# Patient Record
Sex: MALE | Race: OTHER | Employment: FULL TIME | URBAN - METROPOLITAN AREA
[De-identification: names, ages, dates, MRNs, and addresses within clinical notes are randomized per-mention and may not be internally consistent; named-entity substitution may affect disease eponyms.]

---

## 2018-12-18 ENCOUNTER — HOSPITAL ENCOUNTER (EMERGENCY)
Facility: HOSPITAL | Age: 33
Discharge: HOME | End: 2018-12-19
Attending: EMERGENCY MEDICINE
Payer: COMMERCIAL

## 2018-12-18 DIAGNOSIS — R10.9 ABDOMINAL PAIN OF UNKNOWN ETIOLOGY: Primary | ICD-10-CM

## 2018-12-18 PROCEDURE — 80048 BASIC METABOLIC PNL TOTAL CA: CPT | Performed by: EMERGENCY MEDICINE

## 2018-12-18 PROCEDURE — 99283 EMERGENCY DEPT VISIT LOW MDM: CPT

## 2018-12-18 PROCEDURE — 96374 THER/PROPH/DIAG INJ IV PUSH: CPT

## 2018-12-18 PROCEDURE — 81003 URINALYSIS AUTO W/O SCOPE: CPT | Performed by: EMERGENCY MEDICINE

## 2018-12-18 PROCEDURE — 85025 COMPLETE CBC W/AUTO DIFF WBC: CPT | Performed by: EMERGENCY MEDICINE

## 2018-12-18 PROCEDURE — 80076 HEPATIC FUNCTION PANEL: CPT | Performed by: EMERGENCY MEDICINE

## 2018-12-18 PROCEDURE — 83690 ASSAY OF LIPASE: CPT | Performed by: EMERGENCY MEDICINE

## 2018-12-18 RX ORDER — KETOROLAC TROMETHAMINE 30 MG/ML
30 INJECTION, SOLUTION INTRAMUSCULAR; INTRAVENOUS ONCE
Status: COMPLETED | OUTPATIENT
Start: 2018-12-18 | End: 2018-12-18

## 2018-12-18 RX ORDER — IBUPROFEN 600 MG/1
600 TABLET ORAL EVERY 8 HOURS PRN
Qty: 20 TABLET | Refills: 0 | Status: SHIPPED | OUTPATIENT
Start: 2018-12-18 | End: 2018-12-25

## 2018-12-19 VITALS
HEART RATE: 92 BPM | RESPIRATION RATE: 16 BRPM | DIASTOLIC BLOOD PRESSURE: 78 MMHG | OXYGEN SATURATION: 99 % | TEMPERATURE: 98 F | WEIGHT: 195 LBS | SYSTOLIC BLOOD PRESSURE: 128 MMHG

## 2018-12-19 NOTE — ED NOTES
The patient is cleared for discharge per Emergency Department physician. Discharge instructions were reviewed with patient including when and how to follow up with healthcare providers and when to seek emergency care.  Medication use was reviewed and presc

## 2018-12-19 NOTE — ED PROVIDER NOTES
Patient Seen in: [facility] Emergency Department    History   Patient presents with:  Abdomen/Flank Pain (GI/)    Stated Complaint: abdominal pain    HPI    55-year-old male with no significant past medical history here with complaints of abdomi 12/18/18 2256 131/77   Pulse 12/18/18 2255 93   Resp 12/18/18 2310 16   Temp 12/18/18 2255 98 °F (36.7 °C)   Temp src --    SpO2 12/18/18 2256 99 %   O2 Device --        Current:/77   Pulse 93   Temp 98 °F (36.7 °C)   Resp 16   Wt 88.5 kg   SpO2 99% 0. 78 0.50 - 1.50 mg/dL    Calcium, Total 8.7 8.5 - 10.5 mg/dL    BUN/CREA Ratio 21.8 (H) 10.0 - 20.0    Anion Gap 4 0 - 18 mmol/L    Calculated Osmolality 290 275 - 295 mOsm/kg    GFR, Non- >60 >=60    GFR, -American >60 >=60   HEPAT Imaging Results Available and Reviewed by me while in ED:          EMERGENCY DEPARTMENT COURSE AND TREATMENT:  Patient's condition was stable during Emergency Department evaluation.      33yoM with abdominal pain  - I personally reviewed and interpret hours as needed.   Qty: 20 tablet Refills: 0

## 2018-12-19 NOTE — ED NOTES
Devon Fitzpatrick presents with three days of subumbilical pain. NO nausea, no vomiting, no diarrhea. No fevers. Pt does report heavy lifting. No bulges or masses. Pt does report hx of gall bladder surgery \"With Lasers\".  Pt states pain is worse when bearing down, la

## 2025-01-17 ENCOUNTER — APPOINTMENT (OUTPATIENT)
Dept: CT IMAGING | Facility: HOSPITAL | Age: 40
End: 2025-01-17
Attending: EMERGENCY MEDICINE
Payer: COMMERCIAL

## 2025-01-17 ENCOUNTER — HOSPITAL ENCOUNTER (INPATIENT)
Facility: HOSPITAL | Age: 40
LOS: 1 days | Discharge: HOME | End: 2025-01-19
Attending: EMERGENCY MEDICINE | Admitting: EMERGENCY MEDICINE
Payer: COMMERCIAL

## 2025-01-17 ENCOUNTER — APPOINTMENT (OUTPATIENT)
Dept: GENERAL RADIOLOGY | Facility: HOSPITAL | Age: 40
End: 2025-01-17
Attending: EMERGENCY MEDICINE
Payer: COMMERCIAL

## 2025-01-17 DIAGNOSIS — J45.51 SEVERE PERSISTENT ASTHMA WITH EXACERBATION (HCC): ICD-10-CM

## 2025-01-17 DIAGNOSIS — R09.02 HYPOXIC: Primary | ICD-10-CM

## 2025-01-17 DIAGNOSIS — I27.20 PULMONARY HYPERTENSION (HCC): ICD-10-CM

## 2025-01-17 DIAGNOSIS — J18.9 ATYPICAL PNEUMONIA: ICD-10-CM

## 2025-01-17 DIAGNOSIS — R60.0 PERIPHERAL EDEMA: ICD-10-CM

## 2025-01-17 LAB
ALBUMIN SERPL-MCNC: 3.9 G/DL (ref 3.2–4.8)
ALP LIVER SERPL-CCNC: 86 U/L
ALT SERPL-CCNC: 26 U/L
ANION GAP SERPL CALC-SCNC: 4 MMOL/L (ref 0–18)
AST SERPL-CCNC: 30 U/L (ref ?–34)
BASOPHILS # BLD AUTO: 0.02 X10(3) UL (ref 0–0.2)
BASOPHILS NFR BLD AUTO: 0.2 %
BILIRUB DIRECT SERPL-MCNC: 0.2 MG/DL (ref ?–0.3)
BILIRUB SERPL-MCNC: 0.6 MG/DL (ref 0.3–1.2)
BILIRUB UR QL: NEGATIVE
BNP SERPL-MCNC: 96 PG/ML (ref ?–100)
BUN BLD-MCNC: 12 MG/DL (ref 9–23)
BUN/CREAT SERPL: 13.5 (ref 10–20)
CALCIUM BLD-MCNC: 8.8 MG/DL (ref 8.7–10.4)
CHLORIDE SERPL-SCNC: 102 MMOL/L (ref 98–112)
CLARITY UR: CLEAR
CO2 SERPL-SCNC: 33 MMOL/L (ref 21–32)
CREAT BLD-MCNC: 0.89 MG/DL
D DIMER PPP FEU-MCNC: 0.48 UG/ML FEU (ref ?–0.5)
DEPRECATED RDW RBC AUTO: 48.5 FL (ref 35.1–46.3)
EGFRCR SERPLBLD CKD-EPI 2021: 112 ML/MIN/1.73M2 (ref 60–?)
EOSINOPHIL # BLD AUTO: 0.11 X10(3) UL (ref 0–0.7)
EOSINOPHIL NFR BLD AUTO: 1.2 %
ERYTHROCYTE [DISTWIDTH] IN BLOOD BY AUTOMATED COUNT: 13.6 % (ref 11–15)
GLUCOSE BLD-MCNC: 101 MG/DL (ref 70–99)
GLUCOSE UR-MCNC: NORMAL MG/DL
HCT VFR BLD AUTO: 47.1 %
HGB BLD-MCNC: 15 G/DL
HGB UR QL STRIP.AUTO: NEGATIVE
IMM GRANULOCYTES # BLD AUTO: 0.02 X10(3) UL (ref 0–1)
IMM GRANULOCYTES NFR BLD: 0.2 %
KETONES UR-MCNC: NEGATIVE MG/DL
LEUKOCYTE ESTERASE UR QL STRIP.AUTO: NEGATIVE
LYMPHOCYTES # BLD AUTO: 1.99 X10(3) UL (ref 1–4)
LYMPHOCYTES NFR BLD AUTO: 21.3 %
MCH RBC QN AUTO: 30.7 PG (ref 26–34)
MCHC RBC AUTO-ENTMCNC: 31.8 G/DL (ref 31–37)
MCV RBC AUTO: 96.3 FL
MONOCYTES # BLD AUTO: 0.89 X10(3) UL (ref 0.1–1)
MONOCYTES NFR BLD AUTO: 9.5 %
NEUTROPHILS # BLD AUTO: 6.3 X10 (3) UL (ref 1.5–7.7)
NEUTROPHILS # BLD AUTO: 6.3 X10(3) UL (ref 1.5–7.7)
NEUTROPHILS NFR BLD AUTO: 67.6 %
NITRITE UR QL STRIP.AUTO: NEGATIVE
OSMOLALITY SERPL CALC.SUM OF ELEC: 288 MOSM/KG (ref 275–295)
PH UR: 7 [PH] (ref 5–8)
PLATELET # BLD AUTO: 250 10(3)UL (ref 150–450)
POTASSIUM SERPL-SCNC: 4.8 MMOL/L (ref 3.5–5.1)
PROT SERPL-MCNC: 8.1 G/DL (ref 5.7–8.2)
PROT UR-MCNC: 30 MG/DL
RBC # BLD AUTO: 4.89 X10(6)UL
SODIUM SERPL-SCNC: 139 MMOL/L (ref 136–145)
SP GR UR STRIP: 1.02 (ref 1–1.03)
TROPONIN I SERPL HS-MCNC: 5 NG/L
UROBILINOGEN UR STRIP-ACNC: NORMAL
WBC # BLD AUTO: 9.3 X10(3) UL (ref 4–11)

## 2025-01-17 PROCEDURE — 99222 1ST HOSP IP/OBS MODERATE 55: CPT | Performed by: INTERNAL MEDICINE

## 2025-01-17 PROCEDURE — 71045 X-RAY EXAM CHEST 1 VIEW: CPT | Performed by: EMERGENCY MEDICINE

## 2025-01-17 PROCEDURE — 71260 CT THORAX DX C+: CPT | Performed by: EMERGENCY MEDICINE

## 2025-01-17 RX ORDER — AZITHROMYCIN 250 MG/1
500 TABLET, FILM COATED ORAL ONCE
Status: COMPLETED | OUTPATIENT
Start: 2025-01-17 | End: 2025-01-17

## 2025-01-17 RX ORDER — ALBUTEROL SULFATE 5 MG/ML
10 SOLUTION RESPIRATORY (INHALATION) ONCE
Status: COMPLETED | OUTPATIENT
Start: 2025-01-17 | End: 2025-01-17

## 2025-01-17 RX ORDER — DEXAMETHASONE SODIUM PHOSPHATE 4 MG/ML
8 VIAL (ML) INJECTION ONCE
Status: COMPLETED | OUTPATIENT
Start: 2025-01-17 | End: 2025-01-17

## 2025-01-18 ENCOUNTER — APPOINTMENT (OUTPATIENT)
Dept: CV DIAGNOSTICS | Facility: HOSPITAL | Age: 40
End: 2025-01-18
Attending: INTERNAL MEDICINE
Payer: COMMERCIAL

## 2025-01-18 PROBLEM — J18.9 ATYPICAL PNEUMONIA: Status: ACTIVE | Noted: 2025-01-18

## 2025-01-18 PROBLEM — R60.0 PERIPHERAL EDEMA: Status: ACTIVE | Noted: 2025-01-18

## 2025-01-18 PROBLEM — J45.51 SEVERE PERSISTENT ASTHMA WITH EXACERBATION (HCC): Status: ACTIVE | Noted: 2025-01-18

## 2025-01-18 LAB
ADENOVIRUS PCR:: NOT DETECTED
B PARAPERT DNA SPEC QL NAA+PROBE: NOT DETECTED
B PERT DNA SPEC QL NAA+PROBE: NOT DETECTED
C PNEUM DNA SPEC QL NAA+PROBE: NOT DETECTED
CORONAVIRUS 229E PCR:: NOT DETECTED
CORONAVIRUS HKU1 PCR:: NOT DETECTED
CORONAVIRUS NL63 PCR:: NOT DETECTED
CORONAVIRUS OC43 PCR:: NOT DETECTED
FLUAV RNA SPEC QL NAA+PROBE: NOT DETECTED
FLUBV RNA SPEC QL NAA+PROBE: NOT DETECTED
METAPNEUMOVIRUS PCR:: NOT DETECTED
MYCOPLASMA PNEUMONIA PCR:: NOT DETECTED
PARAINFLUENZA 1 PCR:: NOT DETECTED
PARAINFLUENZA 2 PCR:: NOT DETECTED
PARAINFLUENZA 3 PCR:: NOT DETECTED
PARAINFLUENZA 4 PCR:: NOT DETECTED
PROCALCITONIN SERPL-MCNC: <0.04 NG/ML (ref ?–0.05)
RHINOVIRUS/ENTERO PCR:: NOT DETECTED
RSV RNA SPEC QL NAA+PROBE: NOT DETECTED
SARS-COV-2 RNA NPH QL NAA+NON-PROBE: NOT DETECTED

## 2025-01-18 PROCEDURE — 93306 TTE W/DOPPLER COMPLETE: CPT | Performed by: INTERNAL MEDICINE

## 2025-01-18 PROCEDURE — 99223 1ST HOSP IP/OBS HIGH 75: CPT | Performed by: INTERNAL MEDICINE

## 2025-01-18 PROCEDURE — 99233 SBSQ HOSP IP/OBS HIGH 50: CPT | Performed by: INTERNAL MEDICINE

## 2025-01-18 RX ORDER — ACETAMINOPHEN 500 MG
500 TABLET ORAL EVERY 4 HOURS PRN
Status: DISCONTINUED | OUTPATIENT
Start: 2025-01-18 | End: 2025-01-19

## 2025-01-18 RX ORDER — ALBUTEROL SULFATE 0.83 MG/ML
2.5 SOLUTION RESPIRATORY (INHALATION) EVERY 4 HOURS PRN
Status: DISCONTINUED | OUTPATIENT
Start: 2025-01-18 | End: 2025-01-19

## 2025-01-18 RX ORDER — ALBUTEROL SULFATE 1.25 MG/3ML
1 SOLUTION RESPIRATORY (INHALATION) 2 TIMES DAILY
COMMUNITY
Start: 2025-01-13 | End: 2025-02-12

## 2025-01-18 RX ORDER — METHYLPREDNISOLONE SODIUM SUCCINATE 40 MG/ML
40 INJECTION INTRAMUSCULAR; INTRAVENOUS EVERY 12 HOURS
Status: DISCONTINUED | OUTPATIENT
Start: 2025-01-18 | End: 2025-01-19

## 2025-01-18 RX ORDER — PREDNISONE 20 MG/1
40 TABLET ORAL DAILY
Status: DISCONTINUED | OUTPATIENT
Start: 2025-01-18 | End: 2025-01-18

## 2025-01-18 RX ORDER — FLUTICASONE PROPIONATE AND SALMETEROL 500; 50 UG/1; UG/1
1 POWDER RESPIRATORY (INHALATION) 2 TIMES DAILY
COMMUNITY

## 2025-01-18 RX ORDER — FLUTICASONE PROPIONATE AND SALMETEROL 250; 50 UG/1; UG/1
1 POWDER RESPIRATORY (INHALATION) 2 TIMES DAILY
Status: DISCONTINUED | OUTPATIENT
Start: 2025-01-18 | End: 2025-01-19

## 2025-01-18 RX ORDER — FUROSEMIDE 10 MG/ML
20 INJECTION INTRAMUSCULAR; INTRAVENOUS
Status: DISCONTINUED | OUTPATIENT
Start: 2025-01-18 | End: 2025-01-19

## 2025-01-18 RX ORDER — ENOXAPARIN SODIUM 100 MG/ML
40 INJECTION SUBCUTANEOUS DAILY
Status: DISCONTINUED | OUTPATIENT
Start: 2025-01-18 | End: 2025-01-19

## 2025-01-18 NOTE — PLAN OF CARE
Admit from the ED. A&Ox4. 3 L NC. Denies nausea, denies SOB, denies chest pain. Voiding to the restroom. Saline locked. Denies pain. PCR collected. Up by self. Old scabs/scraps noted on maddy lower legs. Bed locked and lowered. Non-skid socks on. Plan for pulmonolgy consult.    Problem: Patient Centered Care  Goal: Patient preferences are identified and integrated in the patient's plan of care  Description: Interventions:  - What would you like us to know as we care for you? \"I live at home with my mother.\"  - Provide timely, complete, and accurate information to patient/family  - Incorporate patient and family knowledge, values, beliefs, and cultural backgrounds into the planning and delivery of care  - Encourage patient/family to participate in care and decision-making at the level they choose  - Honor patient and family perspectives and choices  Outcome: Progressing     Problem: Patient/Family Goals  Goal: Patient/Family Long Term Goal  Description: Patient's Long Term Goal:     Interventions:  -   - See additional Care Plan goals for specific interventions  Outcome: Progressing  Goal: Patient/Family Short Term Goal  Description: Patient's Short Term Goal:     Interventions:   -   - See additional Care Plan goals for specific interventions  Outcome: Progressing

## 2025-01-18 NOTE — PLAN OF CARE
Pt is A&Ox 4, weaned to and tolerating 1L O2 via NC, tolerating regular diet, voiding freely, denies pain, ambulating independent. Cardiac Echo completed today. Lasix started today per MD. Call light within reach, calls appropriately, I-bed awareness/alarm on.       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  - Provide timely, complete, and accurate information to patient/family  - Incorporate patient and family knowledge, values, beliefs, and cultural backgrounds into the planning and delivery of care  - Encourage patient/family to participate in care and decision-making at the level they choose  - Honor patient and family perspectives and choices  Outcome: Progressing     Problem: Patient/Family Goals  Goal: Patient/Family Long Term Goal  Description: Patient's Long Term Goal: return home     Interventions:  - tolerate discomfort, improving breathing, tolerate interventions   - See additional Care Plan goals for specific interventions  Outcome: Progressing  Goal: Patient/Family Short Term Goal  Description: Patient's Short Term Goal: tolerate discomfort     Interventions:   - PRN nebs, PRN robitussin, PRN O2 via NC   - See additional Care Plan goals for specific interventions  Outcome: Progressing

## 2025-01-18 NOTE — ED INITIAL ASSESSMENT (HPI)
BLE swelling for the last 6months. Today he is coming in because he felt like now they are hard, having some tingling to the bottom of his feet, and it hurts when he walks.  States was started on water pills a few months ago but stopped taking them because they were doing nothing. +SOB, Denies fevers.

## 2025-01-18 NOTE — ED PROVIDER NOTES
Patient Seen in: [facility] Emergency Department    History     Chief Complaint   Patient presents with    Swelling Edema     Stated Complaint: feet swelling    HPI    Patient reports he has sig swelling in legs for months.  Had work up with doctor no findings to explain.  complains of shortness of breath that began 3 days ago.  + cough.  no chest pain.   no fever or chills.   no recent long car ride or plane ride.   History of asthma.    Symptoms worse with activity.    Symptoms improved with rest.     Past Medical History:    Asthma (HCC)       History reviewed. No pertinent surgical history.         No family history on file.    Social History     Socioeconomic History    Marital status: Single   Tobacco Use    Smoking status: Never    Smokeless tobacco: Never   Vaping Use    Vaping status: Never Used   Substance and Sexual Activity    Alcohol use: Yes     Comment: soical    Drug use: Never     Social Drivers of Health     Financial Resource Strain: Not on File (3/6/2022)    Received from [facility]    Financial Resource Strain     Financial Resource Strain: 0   Food Insecurity: Not on File (2024)    Received from [facility]    Food Insecurity     Food: 0   Transportation Needs: Not on File (3/6/2022)    Received from [facility]    Transportation Needs     Transportation: 0   Physical Activity: Not on File (3/6/2022)    Received from [facility]    Physical Activity     Physical Activity: 0   Stress: Not on File (3/6/2022)    Received from [facility]    Stress     Stress: 0   Social Connections: Not on File (2024)    Received from [facility]    Social Connections     Connectedness: 0   Housing Stability: Not on File (3/6/2022)    Received from [facility]    Housing Stability     Housin       Review of Systems    Positive for stated complaint: feet swelling  Other systems are as noted in HPI.  Constitutional and vital signs reviewed.      All other systems reviewed and negative except as noted above.    PSFH elements reviewed  from today and agreed except as otherwise stated in HPI.    Physical Exam     ED Triage Vitals [01/17/25 1932]   /84   Pulse 96   Resp 18   Temp 98.9 °F (37.2 °C)   Temp src Temporal   SpO2 (!) 84 %   O2 Device None (Room air)       Current:/78   Pulse 91   Temp 98.9 °F (37.2 °C) (Temporal)   Resp 18   Ht 162.6 cm (5' 4\")   Wt 127 kg   SpO2 96%   BMI 48.06 kg/m²   PULSE OX 68% room air  GENERAL: mod/sig resp distress on room air  HEAD: normocephalic, atraumatic,   EYES: PERRLA, EOMI,  THROAT: mm dry, no lesions  NECK: supple, no meningeal signs  LUNGS:b exp wheezing little air movement  CARDIO: rr  GI: abdomen is soft and non tender, no masses, nl bowel sounds   EXTREMITIES: from, 5/5 strength in all 4 ext,2+ edema ble  NEURO: alert and oiented *3, 2-12 intact, no focal deficit noted  SKIN: good skin turgor, no  rashes  PSYCH: calm, cooperative,    Differential includes: pneumonia vs. CHF vs. bronchitis vs. PE vs. Asthma exacerbation      ED Course     Labs Reviewed   CBC WITH DIFFERENTIAL WITH PLATELET - Abnormal; Notable for the following components:       Result Value    RDW-SD 48.5 (*)     All other components within normal limits   BASIC METABOLIC PANEL (8) - Abnormal; Notable for the following components:    Glucose 101 (*)     CO2 33.0 (*)     All other components within normal limits   URINALYSIS WITH CULTURE REFLEX - Abnormal; Notable for the following components:    Protein Urine 30 (*)     RBC Urine 3-5 (*)     Squamous Epi. Cells Few (*)     All other components within normal limits   HEPATIC FUNCTION PANEL (7) - Normal   BNP (B TYPE NATRIURETIC PEPTIDE) - Normal   D-DIMER - Normal   TROPONIN I HIGH SENSITIVITY - Normal       MDM     Monitor Interpretation:  Nsr 98    Radiology Interpretation:  XR CHEST AP PORTABLE  (CPT=71045)    Result Date: 1/17/2025  CONCLUSION:  Cardiomegaly with indeterminate masslike opacity in the right suprahilar region.    Dictated by (CST): Neal Sinclair,  MD on 1/17/2025 at 8:46 PM     Finalized by (CST): Neal Sinclair MD on 1/17/2025 at 8:47 PM               Medical Decision Making  Problems Addressed:  Atypical pneumonia: acute illness or injury  Hypoxic: acute illness or injury     Details: Cont neb, iv steroids, ddimer + ct neg for PE  Peripheral edema: chronic illness or injury with exacerbation, progression, or side effects of treatment    Amount and/or Complexity of Data Reviewed  Labs: ordered. Decision-making details documented in ED Course.  Radiology: ordered. Decision-making details documented in ED Course.  Discussion of management or test interpretation with external provider(s): PATIENT GIVEN HOUR LONG NEB SOME IMPROVEMENT.  Ct NEG FOR pe     I spent a total of  44 minutes of critical care time in obtaining history, performing a physical exam, bedside monitoring of interventions, collecting and interpreting tests and discussion with consultants but not including time spent performing procedures.    Spoke with Dr. Brennan in ER for admission, consulted pulmonary as well.    Risk  Decision regarding hospitalization.          Disposition and Plan     Clinical Impression:  1. Hypoxic    2. Peripheral edema    3. Atypical pneumonia    4. Severe persistent asthma with exacerbation (HCC)        Disposition:  Admit    Follow-up:  No follow-up provider specified.    Medications Prescribed:  There are no discharge medications for this patient.      Hospital Problems       Present on Admission             ICD-10-CM Noted POA    * (Principal) Hypoxic R09.02 1/17/2025 Unknown

## 2025-01-18 NOTE — CONSULTS
[facility]  part of [facility]    Report of Consultation    Miguel Borja JrTheron Patient Status:  Inpatient    1985 MRN [id number]   Location [facility] 4W/SW/SE Attending Malcolm Viramontes MD   Hosp Day # 0 PCP None Pcp     Date of Admission:  2025    Reason for Consultation:   Dyspnea    History of Present Illness:   Patient is a 39-year-old male with past medical history significant for asthma who presents with chief complaint of dyspnea with some associated lower extremity edema.  States he is compliant with Advair and albuterol MDI and as needed basis.  Denies any cold/URI like presentation.  Hypoxic during hospital course with saturation 84%.  Currently on 2 L.  Denies significant asthma exacerbation over the course last year.    Past Medical History  Past Medical History:    Asthma (HCC)       Past Surgical History  History reviewed. No pertinent surgical history.    Family History  No family history on file.    Social History  Social History     Socioeconomic History    Marital status: Single   Tobacco Use    Smoking status: Never    Smokeless tobacco: Never   Vaping Use    Vaping status: Never Used   Substance and Sexual Activity    Alcohol use: Yes     Comment: soical    Drug use: Never           Current Medications:  Current Facility-Administered Medications   Medication Dose Route Frequency    enoxaparin (Lovenox) 40 MG/0.4ML SUBQ injection 40 mg  40 mg Subcutaneous Daily    acetaminophen (Tylenol Extra Strength) tab 500 mg  500 mg Oral Q4H PRN    albuterol (Ventolin) (2.5 MG/3ML) 0.083% nebulizer solution 2.5 mg  2.5 mg Nebulization Q4H PRN    predniSONE (Deltasone) tab 40 mg  40 mg Oral Daily    guaiFENesin (Robitussin) 100 MG/5 ML oral liquid 200 mg  200 mg Oral Q4H PRN     Medications Prior to Admission   Medication Sig    Albuterol Sulfate 1.25 MG/3ML Inhalation Nebu Soln Take 3 mL (1.25 mg total) by nebulization in the morning and 3 mL (1.25 mg total)  before bedtime.    WIXELA INHUB 500-50 MCG/ACT Inhalation Aerosol Powder, Breath Activated Inhale 1 puff into the lungs 2 (two) times daily.       Allergies  Allergies[1]    Review of Systems:   Constitutional: denies fevers, chills, weakness, fatigue, recent illness  HEENT: denies headache, sore throat, vision loss  Cardio: denies chest pain, chest pressure, palpitations  Respiratory: dyspnea, cough, wheezing, denies hemoptysis   GI: denies nausea, vomiting, abdominal pain  : denies dysuria, hematuria  Musculoskeletal: denies arthralgia, myalgia  Integumentary: denies rash, itching  Neurological: denies syncope, weakness, dizziness,   Psychiatric: denies depression, anxiety  Hematologic: denies bruising        Physical Exam:   Blood pressure 135/85, pulse 102, temperature 97.9 °F (36.6 °C), temperature source Oral, resp. rate 18, height 5' 4\" (1.626 m), weight 278 lb 6.4 oz (126.3 kg), SpO2 93%.    Constitutional: no acute distress  Eyes: PERRL  ENT: nares patent  Neck: neck supple, no JVD  Cardio: RRR, S1 S2  Respiratory: Expiratory Rales  GI: abdomen soft, non tender, active bowel souds, no organomegaly  Extremities: no clubbing, cyanosis, + lower extremity edema  Neurologic: no gross motor deficits  Skin: warm, dry    Results:   Laboratory Data  Lab Results   Component Value Date    WBC 9.3 01/17/2025    HGB 15.0 01/17/2025    HCT 47.1 01/17/2025    .0 01/17/2025    CREATSERUM 0.89 01/17/2025    BUN 12 01/17/2025     01/17/2025    K 4.8 01/17/2025     01/17/2025    CO2 33.0 (H) 01/17/2025     (H) 01/17/2025    CA 8.8 01/17/2025    ALB 3.9 01/17/2025    ALKPHO 86 01/17/2025    TP 8.1 01/17/2025    AST 30 01/17/2025    ALT 26 01/17/2025    LIP 29 12/18/2018    DDIMER 0.48 01/17/2025         Imaging  CT CHEST PE AORTA (IV ONLY) (CPT=71260)    Result Date: 1/18/2025  CONCLUSION:  1. Negative for pulmonary embolism. 2. Diffuse bronchial wall thickening throughout the lungs with associated  air trapping consistent with the patient's history of asthma/bronchitis.  Scattered bandlike opacities throughout both lungs with greatest involvement of the lower lungs could relate to resulting subsegmental atelectasis and/or chronic scarring. 3. Mildly dilated main pulmonary artery, which can be seen with chronic pulmonary hypertension; correlate clinically. 4. Findings that could indicate an underlying seronegative spondyloarthropathy.   A preliminary report was issued by the [facility] teleradiology service. There are no major discrepancies.  [facility]  Dictated by (CST): Castro Taveras MD on 1/18/2025 at 10:47 AM     Finalized by (CST): Castro Taveras MD on 1/18/2025 at 10:56 AM          XR CHEST AP PORTABLE  (CPT=71045)    Result Date: 1/17/2025  CONCLUSION:  Cardiomegaly with indeterminate masslike opacity in the right suprahilar region.    Dictated by (CST): Neal Sinclair MD on 1/17/2025 at 8:46 PM     Finalized by (CST): Neal Sinclair MD on 1/17/2025 at 8:47 PM           Assessment   1.  Acute asthma exacerbation  2.  Lower extremity edema  3.  Acute hypoxemic respiratory failure  4.  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  -CT chest with some bronchial wall thickening seen and scattered opacities present.  -Empiric IV antibiotic therapy at this time  -IV steroids and gradually wean  -ICS/LABA  -Urged avoidance of known triggers  -Concern for underlying GUILLERMINA with snoring and apneic episodes.  Recommend outpatient polysomnography  -Wean oxygen as tolerated  -2D echo pending  -Reviewed vitals, labs and imaging    Marco Antonio Valente DO  Pulmonary Critical Care Medicine  [facility]  1/18/2025  11:48 AM        [1] No Known Allergies

## 2025-01-18 NOTE — PROGRESS NOTES
Progress Note     Miguel Borja Jr. Patient Status:  Inpatient    1985 MRN [id number]   Location [facility] 4W/SW/SE Attending Malcolm Viramontes MD   Hosp Day # 0 PCP None Pcp     Chief Complaint:   Chief Complaint   Patient presents with    Swelling Edema         Subjective:   S: Patient seen and examined, chart reviewed.   Surprised he will stay.  Still on 2 L NC.  Reports doesn't sleep well.  Reports swollen legs and MORALES.       Review of Systems:   10 point ROS completed and was negative, except for pertinent positive and negatives stated in subjective.                Objective:   Vital signs:  Temp:  [97.9 °F (36.6 °C)-98.9 °F (37.2 °C)] 97.9 °F (36.6 °C)  Pulse:  [] 102  Resp:  [18-24] 18  BP: (102-155)/(61-98) 135/85  SpO2:  [84 %-100 %] 93 %    Wt Readings from Last 6 Encounters:   25 278 lb 6.4 oz (126.3 kg)   18 195 lb (88.5 kg)       Physical Exam:    General: No acute distress. On 2 L NC  Respiratory: Clear to auscultation bilaterally. No wheezes. No rhonchi.  Cardiovascular: S1, S2. Regular rate and rhythm. No murmurs, rubs or gallops.   Abdomen: Soft, nontender, protuberant.  Edema of panus and red.  Positive bowel sounds. No rebound or guarding.  Neurologic: No focal neurological deficits.   Musculoskeletal: Moves all extremities.  Extremities: 2+ edema, red, non-tender    Results:   Diagnostic Data:      Labs:    Labs Last 24 Hours:   BMP     CBC    Other     Na 139 Cl 102 BUN 12 Glu 101   Hb 15.0   PTT - Procal <0.04   K 4.8 CO2 33.0 Cr 0.89   WBC 9.3 >< .0  INR - CRP -   Renal Lytes Endo    Hct 47.1   Trop - D dim 0.48   eGFR - Ca 8.8 POC Gluc  -    LFT   pBNP - Lactic -   eGFR AA - PO4 - A1c -   AST 30 APk 86 Prot 8.1  LDL -      Recent Labs   Lab 25  1956   RBC 4.89   HGB 15.0   HCT 47.1   MCV 96.3   MCH 30.7   MCHC 31.8   RDW 13.6   NEPRELIM 6.30   WBC 9.3   .0       No results found for: \"PT\", \"INR\"    Recent Labs   Lab 25    *   BUN 12   CREATSERUM 0.89   CA 8.8   ALB 3.9      K 4.8      CO2 33.0*   ALKPHO 86   AST 30   ALT 26   BILT 0.6   TP 8.1     ECHO PENDING    CT CHEST PE AORTA (IV ONLY) (CPT=71260)    Result Date: 1/18/2025  CONCLUSION:  1. Negative for pulmonary embolism. 2. Diffuse bronchial wall thickening throughout the lungs with associated air trapping consistent with the patient's history of asthma/bronchitis.  Scattered bandlike opacities throughout both lungs with greatest involvement of the lower lungs could relate to resulting subsegmental atelectasis and/or chronic scarring. 3. Mildly dilated main pulmonary artery, which can be seen with chronic pulmonary hypertension; correlate clinically. 4. Findings that could indicate an underlying seronegative spondyloarthropathy.   A preliminary report was issued by the [facility] teleradiology service. There are no major discrepancies.  [facility]  Dictated by (CST): Castro Taveras MD on 1/18/2025 at 10:47 AM     Finalized by (CST): Castro Taveras MD on 1/18/2025 at 10:56 AM          XR CHEST AP PORTABLE  (CPT=71045)    Result Date: 1/17/2025  CONCLUSION:  Cardiomegaly with indeterminate masslike opacity in the right suprahilar region.    Dictated by (CST): Neal Sinclair MD on 1/17/2025 at 8:46 PM     Finalized by (CST): Neal Sinclair MD on 1/17/2025 at 8:47 PM               Pro-Calcitonin  Recent Labs   Lab 01/18/25  0404   PCT <0.04       Cardiac  No results for input(s): \"TROP\", \"PBNP\" in the last 168 hours.    Imaging: Imaging data reviewed in [facility].    CT CHEST PE AORTA (IV ONLY) (CPT=71260)    Result Date: 1/18/2025  CONCLUSION:  1. Negative for pulmonary embolism. 2. Diffuse bronchial wall thickening throughout the lungs with associated air trapping consistent with the patient's history of asthma/bronchitis.  Scattered bandlike opacities throughout both lungs with greatest involvement of the lower lungs could relate to resulting  subsegmental atelectasis and/or chronic scarring. 3. Mildly dilated main pulmonary artery, which can be seen with chronic pulmonary hypertension; correlate clinically. 4. Findings that could indicate an underlying seronegative spondyloarthropathy.   A preliminary report was issued by the [facility] teleradiology service. There are no major discrepancies.  [facility]  Dictated by (CST): Castro Taveras MD on 1/18/2025 at 10:47 AM     Finalized by (CST): Castro Taveras MD on 1/18/2025 at 10:56 AM          XR CHEST AP PORTABLE  (CPT=71045)    Result Date: 1/17/2025  CONCLUSION:  Cardiomegaly with indeterminate masslike opacity in the right suprahilar region.    Dictated by (CST): Neal Sinclair MD on 1/17/2025 at 8:46 PM     Finalized by (CST): Neal Sinclair MD on 1/17/2025 at 8:47 PM              Medications:    enoxaparin  40 mg Subcutaneous Daily    methylPREDNISolone  40 mg Intravenous Q12H    fluticasone-salmeterol  1 puff Inhalation BID       Assessment & Plan:   ASSESSMENT / PLAN:   40 yo presents with LE edema and hypoxia    GUILLERMINA  Needs outpatient PSG  Trial of CPAP  Pulm consulted  Await ECHO, suspect significant PHTN.   Acute hypoxic resp failure  Suspect multifactorial GUILLERMINA and Asthma  On ICS/LABA and alb  On steroids  On o2  Procal neg  Resp viral panel neg    LE edema, Anasarca 2/2 to PHTN  Started on lasix trial  Await ECHO  CPAP trial  Obesity  Dietary consult  Lifestyle changes           Plan of care discussed with patient and family at bedside.  Brother and mother. Acknowledged understanding and agrees to plan. Also discussed with the ED physician.      dvt prophylaxis: sc lovenox  code status: full code  dispo: home upon medical clearance    >55min spent, >50% spent counseling and coordinating care in the form of educating pt/family and d/w consultants and staff. Most of the time spent discussing the above plan.     Estimated date of discharge: tomorrow  Discharge is dependent on: wean  of o2  At this point Mr. Borja is expected to be discharge to: home    Plan of care discussed with patient    Malcolm Viramontes MD, FAAP, FACP  Critical access hospital Hospitalist  I respond to Epic Chat and [facility]

## 2025-01-18 NOTE — H&P
[facility]  part of [facility]                                                                                                          History and Physical     Miguel Borja Jr. Patient Status:  Emergency    1985 MRN [id number]   Location [facility] EMERGENCY DEPARTMENT Attending Melchor Lopez MD   Hosp Day # 0 PCP None Pcp       Chief Complaint: Lower extremity swelling    Subjective:    Miguel Borja Jr. is a 39 year old male with history of asthma, obesity who presented to the ED with worsening lower extremity swelling.  Swelling has been progressively worse now associated with tightness but no pain.  This was previously evaluated about 3 months ago, he was put on Lasix which he has since discontinued as it did not provide any improvement.    He works as a  and is on his feet all day.  He has not noted any change in swelling at beginning of day versus end of day.  He has not tried compression stockings.  In the ED, he also reported shortness of breath associated with his asthma.  He notes this is typical seasonal asthma for him.  He is not any more short of breath than usual.  He just got his meds refilled 2 days ago.  He denies fever or chills.    At presentation to the ED vital stable except for oxygen saturation of 84%.  Labs fairly stable except CO2 33.  BNP 96.  D-dimer 0.48  CXR: Cardiomegaly with indeterminate masslike opacity in the right suprahilar region.  CT chest: No PE.  Scattered bilateral dependent groundglass opacities which may be due to an atypical infectious process or atelectasis.  Mediastinal and cardiac structures are unremarkable.    He has been put on supplemental oxygen.  Started on antibiotics and steroids.  Pulmonology consulted.  He will be admitted for further treatment.    History/Other:      Past Medical History:  Past Medical History:    Asthma (HCC)        Past Surgical History: History reviewed. No pertinent surgical  history.    Social History:  reports that he has never smoked. He has never used smokeless tobacco. He reports current alcohol use. He reports that he does not use drugs.    Family History: No family history on file.    Allergies: Allergies[1]    Medications:  Medications Ordered Prior to Encounter[2]    Review of Systems:   A comprehensive 14 point review of systems was completed.    Pertinent positives and negatives noted in the HPI.    Objective:     /72   Pulse 91   Temp 98.9 °F (37.2 °C) (Temporal)   Resp 21   Ht 5' 4\" (1.626 m)   Wt 280 lb (127 kg)   SpO2 100%   BMI 48.06 kg/m²   General: No acute distress.    HEENT: Normocephalic, atraumatic.  Neck: Supple.  Respiratory: Mild bilateral wheezing, normal effort  Cardiovascular: S1, S2 regular.  Normal rate.   Abdomen: Soft, nontender distended.  Neurologic: Alert, Oriented x 3.  Nonfocal.  Musculoskeletal: No tenderness or deformity.  Extremities: 2+ bilateral lower extremity nonpitting edema   Psychiatric: Appropriate    Results:      Labs:  Labs Last 24 Hours:   BMP     CBC    Other     Na 139 Cl 102 BUN 12 Glu 101   Hb 15.0   PTT - Procal -   K 4.8 CO2 33.0 Cr 0.89   WBC 9.3 >< .0  INR - CRP -   Renal Lytes Endo    Hct 47.1   Trop - D dim 0.48   eGFR - Ca 8.8 POC Gluc  -    LFT   pBNP - Lactic -   eGFR AA - PO4 - A1c -   AST 30 APk 86 Prot 8.1  LDL -     Mg - TSH -   ALT 26 T maddy 0.6 Alb 3.9          COVID-19 Lab Results    COVID-19  No results found for: \"COVID19\"    Pro-Calcitonin  No results for input(s): \"PCT\" in the last 168 hours.    Cardiac  No results for input(s): \"TROP\", \"PBNP\" in the last 168 hours.    Creatinine Kinase  No results for input(s): \"CK\" in the last 168 hours.    Inflammatory Markers  Recent Labs   Lab 01/17/25  2046   DDIMER 0.48       Imaging: Imaging data reviewed in Epic.    Assessment & Plan:    Hypoxia probably due to asthma flare versus infectious etiology versus obesity  Asthma exacerbation  -Chest CT  suggesting atypical infection versus atelectasis  -Patient afebrile without leukocytosis  -Check procalcitonin, respiratory PCR  -Got a dose of antibiotics in the ED, will continue antibiotics if procalcitonin elevated  -Oxygen as tolerated  -Steroids, nebs  -Pulmonology on consult    Bilateral lower extremity swelling, suspect venous insufficiency  -BNP WNL  -Echocardiogram  -Trial of compression stockings    Obesity  -Lifestyle changes      Quality:  DVT Prophylaxis: Lovenox  CODE status: Full code  ALON: TBD      Plan of care discussed with patient and family at bedside.  Brother and mother. Acknowledged understanding and agrees to plan. Also discussed with the ED physician.    56Minutes spent on this admission - examining patient, obtaining history, reviewing previous medical records, going over test results/imaging and discussing plan of care. More than 50% of the time was spent in counseling and/or coordination of care related to hypoxia/asthma exacerbation/edema.   All questions answered.     Cate Brennan MD  1/17/2025                   [1] No Known Allergies  [2]   No current facility-administered medications on file prior to encounter.     No current outpatient medications on file prior to encounter.

## 2025-01-18 NOTE — ED QUICK NOTES
Orders for admission, patient is aware of plan and ready to go upstairs. Any questions, please call ED RN Naheed at extension [phone].     Pt on 3L NC    Patient Covid vaccination status: Unvaccinated     COVID Test Ordered in ED: None    COVID Suspicion at Admission: N/A    Running Infusions:      Mental Status/LOC at time of transport: a&ox4    Other pertinent information:   CIWA score: N/A   NIH score:  N/A

## 2025-01-19 VITALS
SYSTOLIC BLOOD PRESSURE: 131 MMHG | WEIGHT: 278.38 LBS | DIASTOLIC BLOOD PRESSURE: 83 MMHG | BODY MASS INDEX: 47.53 KG/M2 | HEART RATE: 82 BPM | OXYGEN SATURATION: 93 % | HEIGHT: 64 IN | TEMPERATURE: 98 F | RESPIRATION RATE: 18 BRPM

## 2025-01-19 PROBLEM — J18.9 ATYPICAL PNEUMONIA: Status: RESOLVED | Noted: 2025-01-18 | Resolved: 2025-01-19

## 2025-01-19 PROBLEM — R09.02 HYPOXIC: Status: RESOLVED | Noted: 2025-01-17 | Resolved: 2025-01-19

## 2025-01-19 PROBLEM — J45.51 SEVERE PERSISTENT ASTHMA WITH EXACERBATION (HCC): Status: RESOLVED | Noted: 2025-01-18 | Resolved: 2025-01-19

## 2025-01-19 PROCEDURE — 99232 SBSQ HOSP IP/OBS MODERATE 35: CPT | Performed by: INTERNAL MEDICINE

## 2025-01-19 PROCEDURE — 99239 HOSP IP/OBS DSCHRG MGMT >30: CPT | Performed by: INTERNAL MEDICINE

## 2025-01-19 PROCEDURE — 5A09357 ASSISTANCE WITH RESPIRATORY VENTILATION, LESS THAN 24 CONSECUTIVE HOURS, CONTINUOUS POSITIVE AIRWAY PRESSURE: ICD-10-PCS | Performed by: INTERNAL MEDICINE

## 2025-01-19 RX ORDER — FUROSEMIDE 20 MG/1
20 TABLET ORAL 2 TIMES DAILY PRN
Qty: 30 TABLET | Refills: 1 | Status: SHIPPED | OUTPATIENT
Start: 2025-01-19

## 2025-01-19 RX ORDER — PREDNISONE 10 MG/1
20 TABLET ORAL DAILY
Qty: 10 TABLET | Refills: 0 | Status: SHIPPED | OUTPATIENT
Start: 2025-01-19 | End: 2025-01-24

## 2025-01-19 NOTE — PLAN OF CARE
Patient is alert and oriented x4. 3-4L O2 nasal cannula, wore CPAP at night. Tolerating diet. Up independently. Call light within reach. Fall precautions maintained.     Problem: Patient Centered Care  Goal: Patient preferences are identified and integrated in the patient's plan of care  Description: Interventions:  - What would you like us to know as we care for you?   - Provide timely, complete, and accurate information to patient/family  - Incorporate patient and family knowledge, values, beliefs, and cultural backgrounds into the planning and delivery of care  - Encourage patient/family to participate in care and decision-making at the level they choose  - Honor patient and family perspectives and choices  Outcome: Progressing     Problem: Patient/Family Goals  Goal: Patient/Family Long Term Goal  Description: Patient's Long Term Goal:     Interventions:  -   - See additional Care Plan goals for specific interventions  Outcome: Progressing  Goal: Patient/Family Short Term Goal  Description: Patient's Short Term Goal:     Interventions:   -   - See additional Care Plan goals for specific interventions  Outcome: Progressing     Problem: RESPIRATORY - ADULT  Goal: Achieves optimal ventilation and oxygenation  Description: INTERVENTIONS:  - Assess for changes in respiratory status  - Assess for changes in mentation and behavior  - Position to facilitate oxygenation and minimize respiratory effort  - Oxygen supplementation based on oxygen saturation or ABGs  - Provide Smoking Cessation handout, if applicable  - Encourage broncho-pulmonary hygiene including cough, deep breathe, Incentive Spirometry  - Assess the need for suctioning and perform as needed  - Assess and instruct to report SOB or any respiratory difficulty  - Respiratory Therapy support as indicated  - Manage/alleviate anxiety  - Monitor for signs/symptoms of CO2 retention  Outcome: Progressing

## 2025-01-19 NOTE — PLAN OF CARE
Pt is A&Ox 4, room air, tolerating regular diet, voiding freely, denies pain, ambulating independent. Call light within reach, calls appropriately, I-bed awareness/alarm on while admitted. Pt cleared for discharge by MD. Education provided to pt with pt family at bedside - see AVS - pt verbalized understanding, all questions answered to the best of my ability.      Problem: Patient Centered Care  Goal: Patient preferences are identified and integrated in the patient's plan of care  Description: Interventions:  - What would you like us to know as we care for you? Ready to go home   - Provide timely, complete, and accurate information to patient/family  - Incorporate patient and family knowledge, values, beliefs, and cultural backgrounds into the planning and delivery of care  - Encourage patient/family to participate in care and decision-making at the level they choose  - Honor patient and family perspectives and choices  Outcome: Adequate for Discharge     Problem: Patient/Family Goals  Goal: Patient/Family Long Term Goal  Description: Patient's Long Term Goal: return home     Interventions:  - tolerate interventions  - See additional Care Plan goals for specific interventions  Outcome: Adequate for Discharge  Goal: Patient/Family Short Term Goal  Description: Patient's Short Term Goal: improve breathing     Interventions:   - PRN O2, PRN Nebs, Prednisone   - See additional Care Plan goals for specific interventions  Outcome: Adequate for Discharge     Problem: RESPIRATORY - ADULT  Goal: Achieves optimal ventilation and oxygenation  Description: INTERVENTIONS:  - Assess for changes in respiratory status  - Assess for changes in mentation and behavior  - Position to facilitate oxygenation and minimize respiratory effort  - Oxygen supplementation based on oxygen saturation or ABGs  - Provide Smoking Cessation handout, if applicable  - Encourage broncho-pulmonary hygiene including cough, deep breathe, Incentive  Spirometry  - Assess the need for suctioning and perform as needed  - Assess and instruct to report SOB or any respiratory difficulty  - Respiratory Therapy support as indicated  - Manage/alleviate anxiety  - Monitor for signs/symptoms of CO2 retention  Outcome: Adequate for Discharge

## 2025-01-19 NOTE — PROGRESS NOTES
[facility]  part of [facility]     Progress Note    Miguel Borja JrTheron Patient Status:  Inpatient    1985 MRN [id number]   Location [facility] 4W/SW/SE Attending Malcolm Viramontes MD   Hosp Day # 1 PCP None Pcp       Subjective:   Patient seen and examined.  Dyspnea cough improved    Objective:   Blood pressure 131/83, pulse 82, temperature 98.1 °F (36.7 °C), temperature source Oral, resp. rate 18, height 5' 4\" (1.626 m), weight 278 lb 6.4 oz (126.3 kg), SpO2 93%.  Intake/Output:   Last 3 shifts: I/O last 3 completed shifts:  In: 340 [P.O.:240; IV PIGGYBACK:100]  Out: 500 [Urine:500]   This shift: I/O this shift:  In: 200 [P.O.:200]  Out: -      Vent Settings:      Hemodynamic parameters (last 24 hours):      Scheduled Meds:   Current Facility-Administered Medications   Medication Dose Route Frequency    enoxaparin (Lovenox) 40 MG/0.4ML SUBQ injection 40 mg  40 mg Subcutaneous Daily    acetaminophen (Tylenol Extra Strength) tab 500 mg  500 mg Oral Q4H PRN    albuterol (Ventolin) (2.5 MG/3ML) 0.083% nebulizer solution 2.5 mg  2.5 mg Nebulization Q4H PRN    guaiFENesin (Robitussin) 100 MG/5 ML oral liquid 200 mg  200 mg Oral Q4H PRN    methylPREDNISolone sodium succinate (Solu-MEDROL) injection 40 mg  40 mg Intravenous Q12H    fluticasone-salmeterol (Advair Diskus) 250-50 MCG/ACT inhaler 1 puff  1 puff Inhalation BID    furosemide (Lasix) 10 mg/mL injection 20 mg  20 mg Intravenous BID (Diuretic)       Continuous Infusions:     Physical Exam  Constitutional: no acute distress  Eyes: PERRL  ENT: nares pateint  Neck: supple, no JVD  Cardio: RRR, S1 S2  Respiratory: clear to auscultation bilaterally, no wheezing, rales, rhonchi, crackles  GI: abdomen soft, non tender, active bowel sounds, no organomegaly  Extremities: no clubbing, cyanosis, edema  Neurologic: no gross motor deficits  Skin: warm, dry      Results:        CT CHEST PE AORTA (IV ONLY) (CPT=71260)    Result Date:  1/18/2025  CONCLUSION:  1. Negative for pulmonary embolism. 2. Diffuse bronchial wall thickening throughout the lungs with associated air trapping consistent with the patient's history of asthma/bronchitis.  Scattered bandlike opacities throughout both lungs with greatest involvement of the lower lungs could relate to resulting subsegmental atelectasis and/or chronic scarring. 3. Mildly dilated main pulmonary artery, which can be seen with chronic pulmonary hypertension; correlate clinically. 4. Findings that could indicate an underlying seronegative spondyloarthropathy.   A preliminary report was issued by the [facility] teleradiology service. There are no major discrepancies.  [facility]  Dictated by (CST): Castro Taveras MD on 1/18/2025 at 10:47 AM     Finalized by (CST): Castro Taveras MD on 1/18/2025 at 10:56 AM          XR CHEST AP PORTABLE  (CPT=71045)    Result Date: 1/17/2025  CONCLUSION:  Cardiomegaly with indeterminate masslike opacity in the right suprahilar region.    Dictated by (CST): Neal Sinclair MD on 1/17/2025 at 8:46 PM     Finalized by (CST): Neal Sinclair MD on 1/17/2025 at 8:47 PM                 Assessment   1.  Acute asthma exacerbation  2.  Lower extremity edema  3.  Acute hypoxemic respiratory failure  4.  Possible GUILLERMINA     Plan   -Patient presents with evidence of worsening dyspnea with concern for asthma exacerbation also evidence of lower extremity edema.  -CT chest with some bronchial wall thickening seen and scattered opacities present.  -Empiric IV antibiotic therapy at this time  -IV steroids and gradually wean  -ICS/LABA  -Urged avoidance of known triggers  -Concern for underlying GUILLERMINA with snoring and apneic episodes.  Recommend outpatient polysomnography  -Wean oxygen as tolerated  -Okay for discharge from pulmonary perspective with tapering course of prednisone.     Marco Antonio Valente, DO  Pulmonary Critical Care Medicine  [facility]

## 2025-01-19 NOTE — DISCHARGE SUMMARY
[facility]  part of [facility]    Discharge Summary    Miguel Borja Jr. Patient Status:  Inpatient    1985 MRN [id number]   Location [facility] 4W/SW/SE Attending Malcolm Viramontes MD   Hosp Day # 1 PCP None Pcp     Date of Admission: 2025 Disposition: Home or Self Care     Date of Discharge: 2025    Admitting Diagnosis: Peripheral edema [R60.0]  Hypoxic [R09.02]  Atypical pneumonia [J18.9]  Severe persistent asthma with exacerbation (HCC) [J45.51]    Hospital Discharge Diagnoses:   40 yo presents with LE edema and hypoxia     GUILLERMINA  Acute hypoxic resp failur  LE edema, Anasarca 2/2 to PHTN  Obesity          Lace+ Score: 50  59-90 High Risk  29-58 Medium Risk  0-28   Low Risk.    TCM Follow-Up Recommendation:  LACE 29-58: Moderate Risk of readmission after discharge from the hospital.      Problem List:   Patient Active Problem List   Diagnosis    Peripheral edema       Reason for Admission: Leg Swelling    Physical Exam:   General appearance: alert, appears stated age and cooperative  Pulmonary:  clear to auscultation bilaterally  Cardiovascular: S1, S2 normal, no murmur, click, rub or gallop, regular rate and rhythm  Abdominal: soft, non-tender; bowel sounds normal; no masses,  no organomegaly  Extremities: extremities normal, atraumatic, no cyanosis or edema  Psychiatric: calm      History of Present Illness: Miguel Borja Jr. is a 39 year old male with history of asthma, obesity who presented to the ED with worsening lower extremity swelling.  Swelling has been progressively worse now associated with tightness but no pain.  This was previously evaluated about 3 months ago, he was put on Lasix which he has since discontinued as it did not provide any improvement.    He works as a  and is on his feet all day.  He has not noted any change in swelling at beginning of day versus end of day.  He has not tried compression stockings.  In the ED, he also reported  shortness of breath associated with his asthma.  He notes this is typical seasonal asthma for him.  He is not any more short of breath than usual.  He just got his meds refilled 2 days ago.  He denies fever or chills.    Hospital Course: 38 yo presents with LE edema and hypoxia     GUILLERMINA  Needs outpatient PSG  Trial of CPAP  Pulm consulted  Await ECHO, suspect significant PHTN.   Acute hypoxic resp failure  Suspect multifactorial GUILLERMINA and Asthma  On ICS/LABA and alb  On steroids  On o2  Procal neg  Resp viral panel neg     LE edema, Anasarca 2/2 to PHTN  Started on lasix trial  Await ECHO  CPAP trial  Obesity  Dietary consult  Lifestyle changes              Consultations: pulmonary    Procedures: ECHO, showed PHTN    Complications: none    Discharge Condition: Good    Discharge Medications:      Discharge Medications        START taking these medications        Instructions Prescription details   furosemide 20 MG Tabs  Commonly known as: Lasix      Take 1 tablet (20 mg total) by mouth 2 (two) times daily as needed (swollen legs).   Quantity: 30 tablet  Refills: 1     predniSONE 10 MG Tabs  Commonly known as: Deltasone      Take 2 tablets (20 mg total) by mouth daily for 5 days.   Stop taking on: January 24, 2025  Quantity: 10 tablet  Refills: 0            CONTINUE taking these medications        Instructions Prescription details   Albuterol Sulfate 1.25 MG/3ML Nebu  Commonly known as: ACCUNEB      Take 3 mL (1.25 mg total) by nebulization in the morning and 3 mL (1.25 mg total) before bedtime.   Stop taking on: February 12, 2025  Refills: 0     Wixela Inhub 500-50 MCG/ACT Aepb  Generic drug: fluticasone-salmeterol      Inhale 1 puff into the lungs 2 (two) times daily.   Refills: 0               Where to Get Your Medications        These medications were sent to [id number] - [place], [place] - [address], [phone], [phone] [address] [address]       Hours: 24-hours Phone: [phone]   furosemide 20 MG Tabs  predniSONE 10 MG Tabs         Follow up Visits: Follow-up with  in 1 week    Follow up Labs: overnight Polysomnogram     Other Discharge Instructions:     Malcolm Viramontes MD  1/19/2025  12:28 PM    > 35 min

## 2025-01-20 ENCOUNTER — TELEPHONE (OUTPATIENT)
Dept: SLEEP CENTER | Age: 40
End: 2025-01-20

## 2025-01-23 NOTE — PAYOR COMM NOTE
--------------  ADMISSION REVIEW     Payor: ROSANGELA BRUNO POS/MCNP  Subscriber #:  [id number]  Authorization Number: [id number]    Admit date: 1/18/25  Admit time:  2:27 AM       ED Provider Notes        History     HPI  Patient reports he has sig swelling in legs for months.  Had work up with doctor no findings to explain.  complains of shortness of breath that began 3 days ago.  + cough.  no chest pain.   no fever or chills.   no recent long car ride or plane ride.   History of asthma.    Symptoms worse with activity.    Symptoms improved with rest.   ED Triage Vitals [01/17/25 1932]   /84   Pulse 96   Resp 18   Temp 98.9 °F (37.2 °C)   Temp src Temporal   SpO2 (!) 84 %   O2 Device None (Room air)   Current:/78   Pulse 91   Temp 98.9 °F (37.2 °C) (Temporal)   Resp 18   Ht 162.6 cm (5' 4\")   Wt 127 kg   SpO2 96%   BMI 48.06 kg/m²   PULSE OX 68% room air  GENERAL: mod/sig resp distress on room air  HEAD: normocephalic, atraumatic,   EYES: PERRLA, EOMI,  THROAT: mm dry, no lesions  NECK: supple, no meningeal signs  LUNGS:b exp wheezing little air movement  CARDIO: rr  GI: abdomen is soft and non tender, no masses, nl bowel sounds   EXTREMITIES: from, 5/5 strength in all 4 ext,2+ edema ble  NEURO: alert and oiented *3, 2-12 intact, no focal deficit noted  SKIN: good skin turgor, no  rashes  PSYCH: calm, cooperative,    Labs Reviewed   CBC WITH DIFFERENTIAL WITH PLATELET - Abnormal; Notable for the following components:       Result Value    RDW-SD 48.5 (*)     All other components within normal limits   BASIC METABOLIC PANEL (8) - Abnormal; Notable for the following components:    Glucose 101 (*)     CO2 33.0 (*)     All other components within normal limits   URINALYSIS WITH CULTURE REFLEX - Abnormal; Notable for the following components:    Protein Urine 30 (*)     RBC Urine 3-5 (*)     Squamous Epi. Cells Few (*)      XR CHEST AP PORTABLE  (CPT=71045)  Result Date: 1/17/2025  CONCLUSION:  Cardiomegaly  with indeterminate masslike opacity in the right suprahilar region.    Dictated by (CST): Neal Sinclair MD on 1/17/2025 at 8:46 PM     Finalized by (CST): Neal Sinclair MD on 1/17/2025 at 8:47 PM           Disposition and Plan     Clinical Impression:  1. Hypoxic    2. Peripheral edema    3. Atypical pneumonia    4. Severe persistent asthma with exacerbation (HCC)      Disposition:  Admit       History and Physical   Subjective:    Miguel Borja Jr. is a 39 year old male with history of asthma, obesity  Swelling has been progressively worse now associated with tightness but no pain.  This was previously evaluated about 3 months ago, he was put on Lasix which he has since discontinued as it did not provide any improvement.    He works as a  and is on his feet all day.  He has not noted any change in swelling at beginning of day versus end of day.  He has not tried compression stockings.  In the ED, he also reported shortness of breath associated with his asthma.  He notes this is typical seasonal asthma for him.  He is not any more short of breath than usual.  He just got his meds refilled 2 days ago.  He denies fever or chills.     At presentation to the ED vital stable except for oxygen saturation of 84%.  Labs fairly stable except CO2 33.  BNP 96.  D-dimer 0.48  CXR: Cardiomegaly with indeterminate masslike opacity in the right suprahilar region.  CT chest: No PE.  Scattered bilateral dependent groundglass opacities which may be due to an atypical infectious process or atelectasis.  Mediastinal and cardiac structures are unremarkable.     He has been put on supplemental oxygen.  Started on antibiotics and steroids.  Pulmonology consulted.  He will be admitted for further treatment.       /72   Pulse 91   Temp 98.9 °F (37.2 °C) (Temporal)   Resp 21   Ht 5' 4\" (1.626 m)   Wt 280 lb (127 kg)   SpO2 100%   BMI 48.06 kg/m²     HEENT: Normocephalic, atraumatic.  Neck: Supple.  Respiratory: Mild  bilateral wheezing, normal effort  Cardiovascular: S1, S2 regular.  Normal rate.   Abdomen: Soft, nontender distended.  Neurologic: Alert, Oriented x 3.  Nonfocal.  Musculoskeletal: No tenderness or deformity.  Extremities: 2+ bilateral lower extremity nonpitting edema   Psychiatric: Appropriate     Labs Last 24 Hours:                     BMP         CBC       Other      Na 139 Cl 102 BUN 12 Glu 101     Hb 15.0         K 4.8 CO2 33.0 Cr 0.89     WBC 9.3 >< .0        Lytes        Hct 47.1      D dim 0.48    Ca 8.8        LFT                AST 30 APk 86 Prot 8.1                ALT 26 T maddy 0.6 Alb 3.9            Lab 01/17/25 2046   DDIMER 0.48       Assessment & Plan:    Hypoxia probably due to asthma flare versus infectious etiology versus obesity  Asthma exacerbation  -Chest CT suggesting atypical infection versus atelectasis  -Patient afebrile without leukocytosis  -Check procalcitonin, respiratory PCR  -Got a dose of antibiotics in the ED, will continue antibiotics if procalcitonin elevated  -Oxygen as tolerated  -Steroids, nebs  -Pulmonology on consult     Bilateral lower extremity swelling, suspect venous insufficiency  -BNP WNL  -Echocardiogram  -Trial of compression stockings     Obesity  -Lifestyle changes               1/18/25           Still on 2 L NC.  Reports doesn't sleep well.  Reports swollen legs and MORALES.      Temp:  [97.9 °F (36.6 °C)-98.9 °F (37.2 °C)] 97.9 °F (36.6 °C)  Pulse:  [] 102  Resp:  [18-24] 18  BP: (102-155)/(61-98) 135/85  SpO2:  [84 %-100 %] 93 %     Physical Exam:    General: On 2 L NC  Respiratory: Clear to auscultation bilaterally. No wheezes. No rhonchi.  Cardiovascular: S1, S2. Regular rate and rhythm. No murmurs, rubs or gallops.   Abdomen: Soft, nontender, protuberant.  Edema of panus and red.  Positive bowel sounds. No rebound or guarding.  Neurologic: No focal neurological deficits.   Musculoskeletal: Moves all extremities.  Extremities: 2+ edema, red,  non-tender     Labs Last 24 Hours:                      BMP         CBC       Other      Na 139 Cl 102 BUN 12 Glu 101     Hb 15.0      Procal <0.04    K 4.8 CO2 33.0 Cr 0.89     WBC 9.3 >< .0         Lytes        Hct 47.1      D dim 0.48     Ca 8.8                         AST 30 APk 86 Prot 8.1            Lab 01/17/25 1956   RBC 4.89   HGB 15.0   HCT 47.1   MCV 96.3   MCH 30.7   MCHC 31.8   RDW 13.6   NEPRELIM 6.30   WBC 9.3   .0         Lab 01/17/25 1956   *   BUN 12   CREATSERUM 0.89   CA 8.8   ALB 3.9      K 4.8      CO2 33.0*   ALKPHO 86   AST 30   ALT 26   BILT 0.6   TP 8.1      ECHO PENDING     CT CHEST PE AORTA (IV ONLY) (CPT=71260)   Result Date: 1/18/2025  CONCLUSION:         1. Negative for pulmonary embolism. 2. Diffuse bronchial wall thickening throughout the lungs with associated air trapping consistent with the patient's history of asthma/bronchitis.  Scattered bandlike opacities throughout both lungs with greatest involvement of the lower lungs could relate to resulting subsegmental atelectasis and/or chronic scarring. 3. Mildly dilated main pulmonary artery, which can be seen with chronic pulmonary hypertension; correlate clinically. 4. Findings that could indicate an underlying seronegative spondyloarthropathy.   A preliminary report was issued by the [facility] teleradiology service. There are no major discrepancies.  [facility]  Dictated by (CST): Castro Taveras MD on 1/18/2025 at 10:47 AM     Finalized by (CST): Castro Taveras MD on 1/18/2025 at 10:56 AM           XR CHEST AP PORTABLE  (CPT=71045)   Result Date: 1/17/2025  CONCLUSION:         Cardiomegaly with indeterminate masslike opacity in the right suprahilar region.    Dictated by (CST): Neal Sinclair MD on 1/17/2025 at 8:46 PM     Finalized by (CST): Neal Sinclair MD on 1/17/2025 at 8:47 PM            Lab 01/18/25  0404   PCT <0.04       CT CHEST PE AORTA (IV ONLY) (CPT=71260)   Result  Date: 1/18/2025  CONCLUSION:         1. Negative for pulmonary embolism. 2. Diffuse bronchial wall thickening throughout the lungs with associated air trapping consistent with the patient's history of asthma/bronchitis.  Scattered bandlike opacities throughout both lungs with greatest involvement of the lower lungs could relate to resulting subsegmental atelectasis and/or chronic scarring. 3. Mildly dilated main pulmonary artery, which can be seen with chronic pulmonary hypertension; correlate clinically. 4. Findings that could indicate an underlying seronegative spondyloarthropathy.   A preliminary report was issued by the [facility] teleradiology service. There are no major discrepancies.  [facility]  Dictated by (CST): Castro Taveras MD on 1/18/2025 at 10:47 AM     Finalized by (CST): Castro Taveras MD on 1/18/2025 at 10:56 AM           XR CHEST AP PORTABLE  (CPT=71045)   Result Date: 1/17/2025  CONCLUSION:         Cardiomegaly with indeterminate masslike opacity in the right suprahilar region.    Dictated by (CST): Neal Sinclair MD on 1/17/2025 at 8:46 PM     Finalized by (CST): eNal Sinclair MD on 1/17/2025 at 8:47 PM               Medications:    enoxaparin  40 mg Subcutaneous Daily    methylPREDNISolone  40 mg Intravenous Q12H    fluticasone-salmeterol  1 puff Inhalation BID         Assessment & Plan:   38 yo presents with LE edema and hypoxia     GUILLERMINA  Needs outpatient PSG  Trial of CPAP  Pulm consulted  Await ECHO, suspect significant PHTN.   Acute hypoxic resp failure  Suspect multifactorial GUILLERMINA and Asthma  On ICS/LABA and alb  On steroids  On o2  Procal neg  Resp viral panel neg     LE edema, Anasarca 2/2 to PHTN  Started on lasix trial  Await ECHO  CPAP trial  Obesity  Dietary consult  Lifestyle changes          Plan of care discussed with patient and family at bedside.  Brother and mother. Acknowledged understanding and agrees to plan. Also discussed with the ED physician.        dvt  prophylaxis: sc lovenox                   DATE OF DISCHARGE: 1/19/25         Discharge Summary       Date of Admission: 1/17/2025   Disposition: Home or Self Care      Date of Discharge: 1/19/2025     Admitting Diagnosis: Peripheral edema [R60.0]  Hypoxic [R09.02]  Atypical pneumonia [J18.9]  Severe persistent asthma with exacerbation (HCC) [J45.51]     Hospital Discharge Diagnoses:   40 yo presents with LE edema and hypoxia     GUILLERMINA  Acute hypoxic resp failur  LE edema, Anasarca 2/2 to PHTN  Obesity         Lace+ Score: 50  59-90 High Risk  29-58 Medium Risk  0-28   Low Risk.     TCM Follow-Up Recommendation:  LACE 29-58: Moderate Risk of readmission after discharge from the hospital.      Reason for Admission: Leg Swelling     Physical Exam:   General appearance: alert, appears stated age and cooperative  Pulmonary:  clear to auscultation bilaterally  Cardiovascular: S1, S2 normal, no murmur, click, rub or gallop, regular rate and rhythm  Abdominal: soft, non-tender; bowel sounds normal; no masses,  no organomegaly  Extremities: extremities normal, atraumatic, no cyanosis or edema  Psychiatric: calm        History of Present Illness: Miguel Borja Jr. is a 39 year old male with history of asthma, obesity who presented to the ED with worsening lower extremity swelling.  Swelling has been progressively worse now associated with tightness but no pain.  This was previously evaluated about 3 months ago, he was put on Lasix which he has since discontinued as it did not provide any improvement.    He works as a  and is on his feet all day.  He has not noted any change in swelling at beginning of day versus end of day.  He has not tried compression stockings.  In the ED, he also reported shortness of breath associated with his asthma.  He notes this is typical seasonal asthma for him.  He is not any more short of breath than usual.  He just got his meds refilled 2 days ago.  He denies fever or chills.     Hospital  Course: 38 yo presents with LE edema and hypoxia     GUILLERMINA  Needs outpatient PSG  Trial of CPAP  Pulm consulted  Await ECHO, suspect significant PHTN.   Acute hypoxic resp failure  Suspect multifactorial GUILLERMINA and Asthma  On ICS/LABA and alb  On steroids  On o2  Procal neg  Resp viral panel neg     LE edema, Anasarca 2/2 to PHTN  Started on lasix trial  Await ECHO  CPAP trial  Obesity  Dietary consult  Lifestyle changes               Consultations: pulmonary     Procedures: ECHO, showed PHTN     Complications: none     Discharge Condition: Good                         Vitals (last day) before discharge       Date/Time Temp Pulse Resp BP SpO2 Weight O2 Device O2 Flow Rate (L/min) [facility]    01/19/25 1219 -- -- -- -- 93 % -- None (Room air) -- JF    01/19/25 1123 98.1 °F (36.7 °C) -- 18 131/83 93 % -- Nasal cannula 1 L/min     01/19/25 1005 97.8 °F (36.6 °C) 82 18 137/92 95 % -- Nasal cannula 1 L/min     01/19/25 0952 -- -- -- -- -- -- Nasal cannula 1 L/min     01/19/25 0500 97 °F (36.1 °C) 79 20 124/72 97 % -- CPAP 3 L/min     01/19/25 0032 -- -- -- -- 100 % -- CPAP 3 L/min     01/18/25 2221 -- 105 -- -- -- -- -- -- CY    01/18/25 2206 98.8 °F (37.1 °C) 99 20 140/79 98 % -- Nasal cannula 4 L/min     01/18/25 1247 -- -- -- -- 93 % -- Nasal cannula 1 L/min     01/18/25 1246 -- -- -- -- 88 % -- None (Room air) --     01/18/25 1245 -- -- -- -- 96 % -- Nasal cannula 1.5 L/min     01/18/25 1144 97.9 °F (36.6 °C) 102 18 135/85 93 % -- Nasal cannula 1.5 L/min     01/18/25 0856 -- -- -- -- 96 % -- Nasal cannula 1 L/min     01/18/25 0854 -- -- -- -- 97 % -- Nasal cannula 1.5 L/min     01/18/25 0852 98.1 °F (36.7 °C) 86 18 133/80 99 % -- Nasal cannula 3 L/min     01/18/25 0500 -- -- -- 142/86 -- -- -- --     01/18/25 0244 -- -- -- -- -- 278 lb 6.4 oz (126.3 kg) -- --     01/18/25 0228 98 °F (36.7 °C) 96 18 155/98 95 % -- Nasal cannula 3 L/min     01/18/25 0200 -- 91 19 131/84 95 % -- Nasal cannula 3  L/min     01/18/25 0130 -- 92 24 103/66 94 % -- Nasal cannula 3 L/min     01/18/25 0030 -- 97 22 102/61 94 % -- Nasal cannula 3 L/min KR

## (undated) NOTE — LETTER
To Whom It May Concern:    Miguel Borja Jr. is currently under my medical care and may not return to work at this time.  Please excuse Miguel for 4 days. He may return to work on 1/23/2025.    Activity is restricted as follows: none.    If you require additional information please contact our office.    Sincerely,  Malcolm Viramontes MD, FAAP, FACP  [facility] HOSPITALIST  [address] [facility] [address]  [phone]        Document generated by:  Malcolm Viramontes MD

## (undated) NOTE — LETTER
Date & Time: 12/19/2018, 12:10 AM  Patient: Franc Gonzales. Encounter Provider(s): Cristy Espinal MD       To Whom It May Concern:    Wilver Webber was seen and treated in our department on 12/18/2018 through 12/19/2018.  He should not return to w

## (undated) NOTE — ED AVS SNAPSHOT
Day Marcus. MRN: [id number]    Department:  [facility] Emergency Department   Date of Visit:  12/18/2018           Disclosure     Insurance plans vary and the physician(s) referred by the ER may not be covered by your plan.  Please cont CARE PHYSICIAN AT ONCE OR RETURN IMMEDIATELY TO THE EMERGENCY DEPARTMENT. If you have been prescribed any medication(s), please fill your prescription right away and begin taking the medication(s) as directed.   If you believe that any of the medications